# Patient Record
Sex: FEMALE | Race: WHITE | NOT HISPANIC OR LATINO | Employment: FULL TIME | URBAN - METROPOLITAN AREA
[De-identification: names, ages, dates, MRNs, and addresses within clinical notes are randomized per-mention and may not be internally consistent; named-entity substitution may affect disease eponyms.]

---

## 2022-05-15 ENCOUNTER — APPOINTMENT (OUTPATIENT)
Dept: ULTRASOUND IMAGING | Facility: CLINIC | Age: 37
End: 2022-05-15
Attending: EMERGENCY MEDICINE
Payer: COMMERCIAL

## 2022-05-15 ENCOUNTER — HOSPITAL ENCOUNTER (EMERGENCY)
Facility: CLINIC | Age: 37
Discharge: HOME | End: 2022-05-15
Attending: EMERGENCY MEDICINE | Admitting: EMERGENCY MEDICINE
Payer: COMMERCIAL

## 2022-05-15 VITALS
HEART RATE: 70 BPM | SYSTOLIC BLOOD PRESSURE: 132 MMHG | OXYGEN SATURATION: 97 % | TEMPERATURE: 98.1 F | RESPIRATION RATE: 20 BRPM | DIASTOLIC BLOOD PRESSURE: 88 MMHG

## 2022-05-15 DIAGNOSIS — O20.0 THREATENED MISCARRIAGE: ICD-10-CM

## 2022-05-15 LAB
ANION GAP SERPL CALCULATED.3IONS-SCNC: 3 MMOL/L (ref 3–14)
B-HCG SERPL-ACNC: ABNORMAL IU/L (ref 0–5)
BASOPHILS # BLD AUTO: 0.1 10E3/UL (ref 0–0.2)
BASOPHILS NFR BLD AUTO: 1 %
BUN SERPL-MCNC: 5 MG/DL (ref 7–30)
CALCIUM SERPL-MCNC: 9.1 MG/DL (ref 8.5–10.1)
CHLORIDE BLD-SCNC: 109 MMOL/L (ref 94–109)
CO2 SERPL-SCNC: 24 MMOL/L (ref 20–32)
CREAT SERPL-MCNC: 0.63 MG/DL (ref 0.52–1.04)
EOSINOPHIL # BLD AUTO: 0.1 10E3/UL (ref 0–0.7)
EOSINOPHIL NFR BLD AUTO: 2 %
ERYTHROCYTE [DISTWIDTH] IN BLOOD BY AUTOMATED COUNT: 12.3 % (ref 10–15)
GFR SERPL CREATININE-BSD FRML MDRD: >90 ML/MIN/1.73M2
GLUCOSE BLD-MCNC: 92 MG/DL (ref 70–99)
HCT VFR BLD AUTO: 37.4 % (ref 35–47)
HGB BLD-MCNC: 12.2 G/DL (ref 11.7–15.7)
IMM GRANULOCYTES # BLD: 0 10E3/UL
IMM GRANULOCYTES NFR BLD: 0 %
LYMPHOCYTES # BLD AUTO: 1.4 10E3/UL (ref 0.8–5.3)
LYMPHOCYTES NFR BLD AUTO: 23 %
MCH RBC QN AUTO: 30.5 PG (ref 26.5–33)
MCHC RBC AUTO-ENTMCNC: 32.6 G/DL (ref 31.5–36.5)
MCV RBC AUTO: 94 FL (ref 78–100)
MONOCYTES # BLD AUTO: 0.4 10E3/UL (ref 0–1.3)
MONOCYTES NFR BLD AUTO: 6 %
NEUTROPHILS # BLD AUTO: 4.1 10E3/UL (ref 1.6–8.3)
NEUTROPHILS NFR BLD AUTO: 68 %
NRBC # BLD AUTO: 0 10E3/UL
NRBC BLD AUTO-RTO: 0 /100
PLATELET # BLD AUTO: 195 10E3/UL (ref 150–450)
POTASSIUM BLD-SCNC: 3.9 MMOL/L (ref 3.4–5.3)
RBC # BLD AUTO: 4 10E6/UL (ref 3.8–5.2)
SODIUM SERPL-SCNC: 136 MMOL/L (ref 133–144)
WBC # BLD AUTO: 6.1 10E3/UL (ref 4–11)

## 2022-05-15 PROCEDURE — 80048 BASIC METABOLIC PNL TOTAL CA: CPT | Performed by: EMERGENCY MEDICINE

## 2022-05-15 PROCEDURE — 85025 COMPLETE CBC W/AUTO DIFF WBC: CPT | Performed by: EMERGENCY MEDICINE

## 2022-05-15 PROCEDURE — 36415 COLL VENOUS BLD VENIPUNCTURE: CPT | Performed by: EMERGENCY MEDICINE

## 2022-05-15 PROCEDURE — 84702 CHORIONIC GONADOTROPIN TEST: CPT | Performed by: EMERGENCY MEDICINE

## 2022-05-15 PROCEDURE — 99285 EMERGENCY DEPT VISIT HI MDM: CPT | Mod: 25

## 2022-05-15 PROCEDURE — 76801 OB US < 14 WKS SINGLE FETUS: CPT

## 2022-05-15 ASSESSMENT — ENCOUNTER SYMPTOMS
DYSURIA: 0
FEVER: 0
ABDOMINAL PAIN: 1

## 2022-05-15 NOTE — DISCHARGE INSTRUCTIONS
Please get a repeat HCG level with your OB/GYN to look at viability of the pregnancy     Discharge Instructions  Vaginal Bleeding in Pregnancy    Bleeding in early pregnancy can be a sign of a miscarriage or an abnormal pregnancy, but often is innocent and the pregnancy will continue normally. We may do blood pregnancy tests and ultrasound to try to determine what is causing the bleeding, but sometimes we are unable to tell. If this is the case, it will often require more time, more blood tests, and another ultrasound.     Generally, every Emergency Department visit should have a follow-up clinic visit with either a primary or a specialty clinic/provider. Please follow-up as instructed by your emergency provider today.    Return to the Emergency Department if:  You have severe abdominal (belly) or pelvic pain.  You faint, or feel lightheaded or dizzy.   Your bleeding gets much worse.  You pass any tissue--solid material that does not look like a blood clot. If you pass tissue, save it (even if you have to pull it out of the toilet) and put it in a plastic bag or jar and bring it in.    You have a fever of 100.5 F or higher.  If no pregnancy could be seen:  It may be that everything is normal and it is just too early to see the pregnancy. It is also possible that you could have an ectopic pregnancy, which is a pregnancy in an abnormal location, such as in the tube ( tubal pregnancy ). We don t see evidence that this is likely today but we cannot exclude it with certainty until a pregnancy can be seen in the uterus (womb) and an ectopic pregnancy can cause severe internal bleeding or death so follow-up is very important.  You should not be alone, in case you suddenly become very sick.   You should not have sex or put anything in your vagina.     If a pregnancy was seen in your uterus:  If a heartbeat could be seen, the chance of miscarriage is lower but because you had bleeding the chance of a miscarriage still  exists.  You should not have sex or put anything in your vagina.  Follow-up as directed with your OB/GYN provider.     Facts about miscarriage: We hope you do not have a miscarriage, but if you do, here are important things to know:  Early miscarriage is very common, and having one miscarriage does not mean you will have problems with another pregnancy.  Nothing you did caused it. Taking medicine, drinking alcohol, having sex, exercising, or falling down will not cause a miscarriage.     If you were given a prescription for medicine here today, be sure to read all of the information (including the package insert) that comes with your prescription.  This will include important information about the medicine, its side effects, and any warnings that you need to know about.  The pharmacist who fills the prescription can provide more information and answer questions you may have about the medicine.  If you have questions or concerns that the pharmacist cannot address, please call or return to the Emergency Department.   Remember that you can always come back to the Emergency Department if you are not able to see your regular provider in the amount of time listed above, if you get any new symptoms, or if there is anything that worries you.

## 2022-05-15 NOTE — ED PROVIDER NOTES
History   Chief Complaint:  Vaginal Bleeding     The history is provided by the patient.      Lia Arita is a 36 year old female who presents with vaginal bleeding. Patient reports light vaginal bleeding and cramping today. She is currently 6 weeks and 4 days pregnant. This is her fourth pregnancy. She noticed light blood after wiping. She is not going through pads. She called the nurse line and was advised to be seen. She has not had an ultrasound yet. No dysuria or fever. She did have a cold last week. Appointment with OBGYN on Thursday.     ABO/Rh type and screen 08  ABO: O  Rh: Positive     Review of Systems   Constitutional: Negative for fever.   Gastrointestinal: Positive for abdominal pain.   Genitourinary: Positive for pelvic pain and vaginal bleeding. Negative for dysuria.   All other systems reviewed and are negative.    Allergies:  Tetracyclines     Medications:  Prenatal   Oscald   Cyanocobalamin     Past Medical History:     Dysplasia of cervix     Past Surgical History:    Tonsil and adenoidectomy   Colposcopy     section     Family History:    Son: eczema     Social History:  Presents to ED with visitor      Physical Exam     Patient Vitals for the past 24 hrs:   BP Temp Temp src Pulse Resp SpO2   05/15/22 0039 (!) 139/91 98.1  F (36.7  C) Oral 73 20 99 %       Physical Exam  Constitutional:  Oriented to person, place, and time.   HENT:   Head:    Normocephalic.   Mouth/Throat:   Oropharynx is clear and moist.   Eyes:    EOM are normal. Pupils are equal, round, and reactive to light.   Neck:    Neck supple.   Cardiovascular:  Normal rate, regular rhythm and normal heart sounds.      Exam reveals no gallop and no friction rub.       No murmur heard.  Pulmonary/Chest:  Effort normal and breath sounds normal.      No respiratory distress. No wheezes. No rales.      No reproducible chest wall pain.  Abdominal:   Soft. No distension. No tenderness. No rebound and no guarding.    Musculoskeletal:  Normal range of motion.   Neurological:   Alert and oriented to person, place, and time.           Moves all 4 extremities spontaneously    Skin:    No rash noted. No pallor.      Emergency Department Course     Imaging:  OB  US 1st trimester w transvag   Final Result   IMPRESSION:    Intrauterine gestational sac at 6 weeks and 3 days. An embryo is not visualized. Clinical and ultrasound follow-up recommended.         Report per radiology    Laboratory:  Labs Ordered and Resulted from Time of ED Arrival to Time of ED Departure   BASIC METABOLIC PANEL - Abnormal       Result Value    Sodium 136      Potassium 3.9      Chloride 109      Carbon Dioxide (CO2) 24      Anion Gap 3      Urea Nitrogen 5 (*)     Creatinine 0.63      Calcium 9.1      Glucose 92      GFR Estimate >90     CBC WITH PLATELETS AND DIFFERENTIAL    WBC Count 6.1      RBC Count 4.00      Hemoglobin 12.2      Hematocrit 37.4      MCV 94      MCH 30.5      MCHC 32.6      RDW 12.3      Platelet Count 195      % Neutrophils 68      % Lymphocytes 23      % Monocytes 6      % Eosinophils 2      % Basophils 1      % Immature Granulocytes 0      NRBCs per 100 WBC 0      Absolute Neutrophils 4.1      Absolute Lymphocytes 1.4      Absolute Monocytes 0.4      Absolute Eosinophils 0.1      Absolute Basophils 0.1      Absolute Immature Granulocytes 0.0      Absolute NRBCs 0.0     HCG QUANTITATIVE PREGNANCY      Emergency Department Course:     Reviewed:  I reviewed nursing notes, vitals, past medical history and Care Everywhere    Assessments:  0235 I obtained history and examined the patient as noted above.   0324 I rechecked the patient and explained findings.     Disposition:  The patient was discharged to home.     Impression & Plan         Medical Decision Making:  Ms. Arita is a 36 year old female that came in for vaginal bleeding six weeks pregnant. Differential includes ectopic pregnancy, threatened abortions, miscarriage, or other  causes. Workup thus far shows that she has an early gestational sac without fetus noted. This may be early pregnancy and possible nonviable pregnancy. Ectopic pregnancy is not excluded although seems unlikely. She is Rh positive therefore does not need rhogam. I did discuss the importance of repeat hcg in the next two to three days to assess further viability. She is encouraged to follow up with her OBGYN and should return with any significant bleeding, worsening pain, lightheaded, passing out, any new symptoms or concerns.     Diagnosis:    ICD-10-CM    1. Threatened miscarriage  O20.0        Scribe Disclosure:  NORMAN, London Huertas, am serving as a scribe at 2:13 AM on 5/15/2022 to document services personally performed by Shane Tim MD based on my observations and the provider's statements to me.            Shane Tim MD  05/15/22 0524

## 2022-05-15 NOTE — ED TRIAGE NOTES
Pt states intermittent lower abdominal pain with small amount of vaginal bleeding tonight. Pt states currently approximately 6 weeks gestation. ABCs intact GCS 15     Triage Assessment     Row Name 05/15/22 0040       Triage Assessment (Adult)    Airway WDL WDL       Respiratory WDL    Respiratory WDL WDL       Skin Circulation/Temperature WDL    Skin Circulation/Temperature WDL WDL       Cardiac WDL    Cardiac WDL WDL       Peripheral/Neurovascular WDL    Peripheral Neurovascular WDL WDL       Cognitive/Neuro/Behavioral WDL    Cognitive/Neuro/Behavioral WDL WDL

## 2024-12-21 ENCOUNTER — HOSPITAL ENCOUNTER (EMERGENCY)
Facility: CLINIC | Age: 39
Discharge: HOME | End: 2024-12-21
Attending: EMERGENCY MEDICINE | Admitting: EMERGENCY MEDICINE
Payer: COMMERCIAL

## 2024-12-21 VITALS
HEIGHT: 64 IN | WEIGHT: 165 LBS | OXYGEN SATURATION: 100 % | TEMPERATURE: 98.2 F | HEART RATE: 82 BPM | BODY MASS INDEX: 28.17 KG/M2 | DIASTOLIC BLOOD PRESSURE: 86 MMHG | SYSTOLIC BLOOD PRESSURE: 142 MMHG | RESPIRATION RATE: 19 BRPM

## 2024-12-21 DIAGNOSIS — S61.211A LACERATION OF LEFT INDEX FINGER WITHOUT FOREIGN BODY WITHOUT DAMAGE TO NAIL, INITIAL ENCOUNTER: ICD-10-CM

## 2024-12-21 PROBLEM — J35.8 TONSILLAR EXUDATE: Status: ACTIVE | Noted: 2018-07-13

## 2024-12-21 PROBLEM — L70.0 ACNE VULGARIS: Status: ACTIVE | Noted: 2017-11-12

## 2024-12-21 PROBLEM — O46.90 VAGINAL BLEEDING IN PREGNANCY: Status: ACTIVE | Noted: 2023-02-03

## 2024-12-21 PROBLEM — D50.9 IRON DEFICIENCY ANEMIA: Status: ACTIVE | Noted: 2022-12-21

## 2024-12-21 PROCEDURE — 250N000009 HC RX 250: Performed by: EMERGENCY MEDICINE

## 2024-12-21 PROCEDURE — 12002 RPR S/N/AX/GEN/TRNK2.6-7.5CM: CPT | Mod: F1

## 2024-12-21 PROCEDURE — 99283 EMERGENCY DEPT VISIT LOW MDM: CPT

## 2024-12-21 PROCEDURE — 271N000002 HC RX 271: Performed by: EMERGENCY MEDICINE

## 2024-12-21 RX ORDER — GINSENG 100 MG
CAPSULE ORAL ONCE
Status: DISCONTINUED | OUTPATIENT
Start: 2024-12-21 | End: 2024-12-21 | Stop reason: HOSPADM

## 2024-12-21 RX ORDER — METHYLCELLULOSE 4000CPS 30 %
POWDER (GRAM) MISCELLANEOUS ONCE
Status: COMPLETED | OUTPATIENT
Start: 2024-12-21 | End: 2024-12-21

## 2024-12-21 RX ADMIN — Medication 3 ML: at 15:55

## 2024-12-21 RX ADMIN — Medication 1 EACH: at 15:59

## 2024-12-21 ASSESSMENT — COLUMBIA-SUICIDE SEVERITY RATING SCALE - C-SSRS
1. IN THE PAST MONTH, HAVE YOU WISHED YOU WERE DEAD OR WISHED YOU COULD GO TO SLEEP AND NOT WAKE UP?: NO
6. HAVE YOU EVER DONE ANYTHING, STARTED TO DO ANYTHING, OR PREPARED TO DO ANYTHING TO END YOUR LIFE?: NO
2. HAVE YOU ACTUALLY HAD ANY THOUGHTS OF KILLING YOURSELF IN THE PAST MONTH?: NO

## 2024-12-21 ASSESSMENT — ACTIVITIES OF DAILY LIVING (ADL)
ADLS_ACUITY_SCORE: 41
ADLS_ACUITY_SCORE: 41

## 2024-12-21 NOTE — ED PROVIDER NOTES
EMERGENCY DEPARTMENT ENCOUNTER      NAME: Lia Arita  AGE: 39 year old female  YOB: 1985  MRN: [id number]  EVALUATION DATE & TIME: 12/21/2024  3:35 PM    PCP: Karla Longoria    ED PROVIDER: Apolinar House M.D.      Chief Complaint   Patient presents with    Laceration         IMPRESSION  1. Laceration of left index finger without foreign body without damage to nail, initial encounter        PLAN  - routine non-absorbable sutured wound cares  - suture removal in 7-10 days in PCP clinic (5 total sutures)  - discharge to home    ED COURSE & MEDICAL DECISION MAKING    ED Course as of 12/21/24 1758   Sat Dec 21, 2024   1659 39yoF right-handed with tetanus up to date presenting with her daughter from home for evaluation of left index finger laceration. Was opening a gift at a birthday party this afternoon using a knife; slipped and cut her left index finger. No other injury. No numbness, tingling, focal weakness. Has simple 3cm oblique laceration over extensor surface of proximal phalanx with no tenderness or active bleeding, full strength intact, CMS intact. No concern for bony injury, neurovascular injury, tendon injury, foreign body; patient comfortable not obtaining imaging which I agree with. Laceration closed with 5 simple interrupted sutures without difficulty; patient tolerated this well. Dressed with bacitracin & clean bandage. Patient comfortable with discharge at this time. Return precautions and need for PCP follow up discussed and understood. No further questions at the time of discharge.       --------------------------------------------------------------------------------   --------------------------------------------------------------------------------     4:42 PM  I met with the patient for the initial interview and physical examination. Discussed plan for treatment and workup in the ED.  4:44 PM  I performed a laceration repair.   5:00 PM  We discussed the plan for discharge and  the patient is agreeable. Reviewed supportive cares, symptomatic treatment, outpatient follow up, and reasons to return to the Emergency Department. Patient to be discharged by ED RN.         This patient involved a high degree of complexity in medical decision making, as significant risks were present and assessed. Recent encounters & results in medical record reviewed by me.    All workup (i.e. any EKG/labs/imaging as per charting below) reviewed and independently interpreted by me. See respective sections for details.    Broad differential considered for this patient, including but not limited to:  superficial laceration, tendon injury, bony injury, neurovascular injury, foreign body      See additional MDM below if interested.      MEDICATIONS GIVEN IN THE EMERGENCY DEPARTMENT  Medications   bacitracin ointment (has no administration in time range)   lido-EPINEPHrine-tetracaine (LET) topical gel GEL (3 mLs Topical $Given 12/21/24 9742)   methylcellulose powder (1 each Topical $Given 12/21/24 7285)       NEW PRESCRIPTIONS STARTED AT TODAY'S ER VISIT  Discharge Medication List as of 12/21/2024  5:04 PM        CONTINUE these medications which have NOT CHANGED    Details   AMOXIL 500 MG OR TABS 1 TABLET EVERY 8 hrs x 10 days, Disp-QS, R-0, Normal      HYDROCODONE-ACETAMINOPHEN  MG OR TABS TAKE 1 TO 2 TABLETS EVER 4 TO 6 HOURS AS NEEDED FOR PAIN, Disp-16, R-0, Fax      PRENATAL VITAMIN TABS   OR 1 TABLET DAILY, Disp-30, R-0, Historical                 =================================================================      HPI  Use of : N/A         Lia Arita is a 39 year old female with no pertinent history who presents to this ED by walk-in with her daughter for evaluation of a laceration.     Patient was opening a package toy with a knife for her 3 y/o child when the knife went through the toy and cut her left index finger. She presents to the ED for further evaluation of her wound.     Denies  any fevers, nausea, or vomiting. Patient is otherwise in her normal state of health with no other concerns.           --------------- MEDICAL HISTORY ---------------  PAST MEDICAL HISTORY:  Reviewed independently by me.  Past Medical History:   Diagnosis Date    Dysplasia of cervix (uteri)      Patient Active Problem List   Diagnosis    Mild dysplasia of cervix    Previous  delivery, antepartum condition or complication    Acne vulgaris    Iron deficiency anemia    Tonsillar exudate    Vaginal bleeding in pregnancy       PAST SURGICAL HISTORY:  Reviewed independently by me.  Past Surgical History:   Procedure Laterality Date    [facility] NONSPECIFIC PROCEDURE      Adenoids Removed    [facility] COLP CERVIX/UPPER VAGINA W BX CERVIX  04    colposcopy 04       CURRENT MEDICATIONS:    Reviewed independently by me.    Current Facility-Administered Medications:     bacitracin ointment, , Topical, Once, Apolinar House MD    Current Outpatient Medications:     AMOXIL 500 MG OR TABS, 1 TABLET EVERY 8 hrs x 10 days, Disp: QS, Rfl: 0    HYDROCODONE-ACETAMINOPHEN  MG OR TABS, TAKE 1 TO 2 TABLETS EVER 4 TO 6 HOURS AS NEEDED FOR PAIN, Disp: 16, Rfl: 0    PRENATAL VITAMIN TABS   OR, 1 TABLET DAILY, Disp: 30, Rfl: 0    ALLERGIES:  Reviewed independently by me.  Allergies   Allergen Reactions    No Known Drug Allergy     Tetracyclines & Related Other (See Comments)     Concern with possible side effect with severe myalgias and arthralgis       FAMILY HISTORY:  Reviewed independently by me.  Family History   Problem Relation Age of Onset    Diabetes Paternal Grandmother     Diabetes Other     Diabetes Other          SOCIAL HISTORY:   Reviewed independently by me.  Social History     Socioeconomic History    Marital status: Single   Tobacco Use    Smoking status: Former     Current packs/day: 0.20     Average packs/day: 0.2 packs/day for 7.0 years (1.4 ttl pk-yrs)     Types: Cigarettes    Tobacco comments:     Pt.  "quit on 9-27-07/ smokes   Substance and Sexual Activity    Alcohol use: Yes     Comment: occassionally/Pt. quit when she found out she was pregnant    Drug use: No    Sexual activity: Yes     Partners: Male     Comment: Pt. is currently pregnant   Other Topics Concern     Service No    Blood Transfusions No    Caffeine Concern No    Occupational Exposure Yes    Hobby Hazards No    Sleep Concern No    Stress Concern No    Weight Concern No    Special Diet No    Back Care No    Exercise No    Bike Helmet No    Seat Belt No    Self-Exams No       --------------- PHYSICAL EXAM ---------------  Nursing notes and vitals independently reviewed by me.  VITALS:  Vitals:    12/21/24 1535 12/21/24 1711   BP: (!) 142/86    Pulse: 82    Resp: 19    Temp:  98.2  F (36.8  C)   TempSrc:  Oral   SpO2: 100%    Weight: 74.8 kg (165 lb)    Height: 1.626 m (5' 4\")        PHYSICAL EXAM:    General:  alert, interactive, no distress  Eyes:  conjunctivae clear, conjugate gaze  HENT:  atraumatic, nose with no rhinorrhea, oropharynx clear  Neck:  no meningismus  Cardiovascular:  HR 80's during exam, regular rhythm, no murmurs, brisk cap refill  Chest:  no chest wall tenderness  Pulmonary:  no stridor, normal phonation, normal work of breathing, clear lungs bilaterally  Abdomen:  soft, nondistended, nontender  :  no CVA tenderness  Back:  no midline spinal tenderness  Musculoskeletal:  left index with 3 cm oblique laceration to dorsal surface of mid proximal phalange, no bony tenderness, CMS intact, full strength intact to finger.   Skin:  warm, dry, no rash  Neuro:  awake, alert, answers questions appropriately, follows commands, moves all limbs  Psych:  calm, normal affect      --------------- RESULTS ---------------  PROCEDURES:   [facility]    -Laceration Repair    Date/Time: 12/21/2024 4:44 PM    Performed by: Apolinar House MD  Authorized by: Apolinar House MD    Risks, benefits and " alternatives discussed.      UNIVERSAL PROTOCOL   Site Marked: NA  Prior Images Obtained and Reviewed:  NA  Required items: Required blood products, implants, devices and special equipment available    Patient identity confirmed:  Verbally with patient  NA - No sedation, light sedation, or local anesthesia  Confirmation Checklist:  Relevant allergies  Universal Protocol: the Joint Commission Universal Protocol was followed    Preparation: Patient was prepped and draped in usual sterile fashion      ANESTHESIA (see MAR for exact dosages):     Anesthesia method:  Topical application    Topical anesthetic:  LET  LACERATION DETAILS     Location:  Finger    Finger location:  L index finger    Length (cm):  3    Depth (mm):  2    REPAIR TYPE:     Repair type:  Simple    EXPLORATION:     Wound exploration: wound explored through full range of motion and entire depth of wound probed and visualized      Wound extent: areolar tissue violated      Wound extent: fascia not violated, no foreign body, no signs of injury, no nerve damage, no tendon damage, no underlying fracture and no vascular damage      Contaminated: no      TREATMENT:     Area cleansed with:  Kati    Amount of cleaning:  Standard    Irrigation solution:  Sterile saline    Irrigation method:  Syringe    Visualized foreign bodies/material removed: no      SKIN REPAIR     Repair method:  Sutures    Suture size:  4-0    Suture material:  Nylon    Suture technique:  Simple interrupted    Number of sutures:  5    APPROXIMATION     Approximation:  Close    POST-PROCEDURE DETAILS     Dressing:  Antibiotic ointment and non-adherent dressing      PROCEDURE    Patient Tolerance:  Patient tolerated the procedure well with no immediate complications               --------------- ADDITIONAL MDM ---------------  MIPS:  Not Applicable    History:  - I considered systemic symptoms of the presenting illness.  - Supplemental history from:       -- patient, daughter  -  External Record(s) reviewed:       -- Inpatient/outpatient record (vaccine record, clinic visit 9/15/23), prior labs (swabs 4/22/24), prior imaging (pelvic US 11/10/22)       -- see above ED course & MDM for further details    Workup:  - Chart documentation above includes differential considered and my independent interpretation any EKGs, labs tests, and/or imaging  - emergent/severe conditions considered and evaluated for: see above differential & MDM  - In additional to work up documented, I considered the following work up:       -- X-rays left index finger       -- see above ED course & MDM for further details    External Consultation:  - Discussion of management with another provider:       -- ED pharmacist re: meds       -- see above charting for additional    Complicating Factors:  - Care impacted by chronic illness:       -- see above MDM, past medical history, & problem list    Disposition Considerations:  - Discharge       -- I considered escalation of care with admission to the hospital, but ultimately discharged the patient given reassuring exam, comfortable with discharge home       -- I recommended the patient continue their current prescription strength medication(s) as charted above in current medications list       -- I considered prescription pain medication, comfortable without this       -- I recommended over-the-counter medication(s) as charted above & in discharge instructions       I, Berta Chanel, am serving as a scribe to document services personally performed by Dr. Apolinar House based on my observation and the provider's statements to me. I, Apolinar House MD attest that Berta Chanel is acting in a scribe capacity, has observed my performance of the services and has documented them in accordance with my direction.      Apoilnar House MD  12/21/24  Emergency Medicine  [facility] EMERGENCY ROOM  [address]  [address]  [phone]  Dept: [phone]     Apolinar House MD  12/21/24 0936

## 2024-12-21 NOTE — ED TRIAGE NOTES
Pt here  with a laceration from a knife on her left index finger. Bleeding is controlled at this time.      Triage Assessment (Adult)       Row Name 12/21/24 1536          Triage Assessment    Airway WDL WDL        Respiratory WDL    Respiratory WDL WDL        Skin Circulation/Temperature WDL    Skin Circulation/Temperature WDL WDL        Cardiac WDL    Cardiac WDL WDL        Peripheral/Neurovascular WDL    Peripheral Neurovascular WDL WDL        Cognitive/Neuro/Behavioral WDL    Cognitive/Neuro/Behavioral WDL WDL